# Patient Record
Sex: MALE | Race: OTHER | Employment: FULL TIME | URBAN - METROPOLITAN AREA
[De-identification: names, ages, dates, MRNs, and addresses within clinical notes are randomized per-mention and may not be internally consistent; named-entity substitution may affect disease eponyms.]

---

## 2020-01-18 ENCOUNTER — HOSPITAL ENCOUNTER (EMERGENCY)
Age: 25
Discharge: HOME | End: 2020-01-18
Attending: EMERGENCY MEDICINE
Payer: SELF-PAY

## 2020-01-18 VITALS
SYSTOLIC BLOOD PRESSURE: 135 MMHG | BODY MASS INDEX: 23 KG/M2 | RESPIRATION RATE: 15 BRPM | WEIGHT: 125 LBS | HEIGHT: 62 IN | HEART RATE: 72 BPM | TEMPERATURE: 98.4 F | DIASTOLIC BLOOD PRESSURE: 72 MMHG | OXYGEN SATURATION: 99 %

## 2020-01-18 DIAGNOSIS — K29.90 GASTRITIS AND DUODENITIS: Primary | ICD-10-CM

## 2020-01-18 LAB
ALBUMIN SERPL-MCNC: 4.4 G/DL (ref 3.5–5)
ALBUMIN/GLOB SERPL: 1.5 {RATIO} (ref 1.1–2.2)
ALP SERPL-CCNC: 61 U/L (ref 45–117)
ALT SERPL-CCNC: 62 U/L (ref 12–78)
ANION GAP SERPL CALC-SCNC: 5 MMOL/L (ref 5–15)
APPEARANCE UR: ABNORMAL
AST SERPL-CCNC: 30 U/L (ref 15–37)
BACTERIA URNS QL MICRO: NEGATIVE /HPF
BILIRUB SERPL-MCNC: 0.6 MG/DL (ref 0.2–1)
BILIRUB UR QL: NEGATIVE
BUN SERPL-MCNC: 14 MG/DL (ref 6–20)
BUN/CREAT SERPL: 14 (ref 12–20)
CALCIUM SERPL-MCNC: 9.1 MG/DL (ref 8.5–10.1)
CHLORIDE SERPL-SCNC: 106 MMOL/L (ref 97–108)
CO2 SERPL-SCNC: 30 MMOL/L (ref 21–32)
COLOR UR: ABNORMAL
COMMENT, HOLDF: NORMAL
CREAT SERPL-MCNC: 1.01 MG/DL (ref 0.7–1.3)
EPITH CASTS URNS QL MICRO: ABNORMAL /LPF
ERYTHROCYTE [DISTWIDTH] IN BLOOD BY AUTOMATED COUNT: 11.4 % (ref 11.5–14.5)
GLOBULIN SER CALC-MCNC: 3 G/DL (ref 2–4)
GLUCOSE SERPL-MCNC: 121 MG/DL (ref 65–100)
GLUCOSE UR STRIP.AUTO-MCNC: NEGATIVE MG/DL
HCT VFR BLD AUTO: 46.6 % (ref 36.6–50.3)
HGB BLD-MCNC: 16.3 G/DL (ref 12.1–17)
HGB UR QL STRIP: NEGATIVE
HYALINE CASTS URNS QL MICRO: ABNORMAL /LPF (ref 0–5)
KETONES UR QL STRIP.AUTO: NEGATIVE MG/DL
LEUKOCYTE ESTERASE UR QL STRIP.AUTO: NEGATIVE
LIPASE SERPL-CCNC: 115 U/L (ref 73–393)
MCH RBC QN AUTO: 31.7 PG (ref 26–34)
MCHC RBC AUTO-ENTMCNC: 35 G/DL (ref 30–36.5)
MCV RBC AUTO: 90.5 FL (ref 80–99)
NITRITE UR QL STRIP.AUTO: NEGATIVE
NRBC # BLD: 0 K/UL (ref 0–0.01)
NRBC BLD-RTO: 0 PER 100 WBC
PH UR STRIP: 8.5 [PH] (ref 5–8)
PLATELET # BLD AUTO: 219 K/UL (ref 150–400)
PMV BLD AUTO: 9.8 FL (ref 8.9–12.9)
POTASSIUM SERPL-SCNC: 3.4 MMOL/L (ref 3.5–5.1)
PROT SERPL-MCNC: 7.4 G/DL (ref 6.4–8.2)
PROT UR STRIP-MCNC: NEGATIVE MG/DL
RBC # BLD AUTO: 5.15 M/UL (ref 4.1–5.7)
RBC #/AREA URNS HPF: ABNORMAL /HPF (ref 0–5)
SAMPLES BEING HELD,HOLD: NORMAL
SODIUM SERPL-SCNC: 141 MMOL/L (ref 136–145)
SP GR UR REFRACTOMETRY: 1.01 (ref 1–1.03)
UR CULT HOLD, URHOLD: NORMAL
UROBILINOGEN UR QL STRIP.AUTO: 0.2 EU/DL (ref 0.2–1)
WBC # BLD AUTO: 10.1 K/UL (ref 4.1–11.1)
WBC URNS QL MICRO: ABNORMAL /HPF (ref 0–4)

## 2020-01-18 PROCEDURE — 80053 COMPREHEN METABOLIC PANEL: CPT

## 2020-01-18 PROCEDURE — 99283 EMERGENCY DEPT VISIT LOW MDM: CPT

## 2020-01-18 PROCEDURE — 83690 ASSAY OF LIPASE: CPT

## 2020-01-18 PROCEDURE — 81001 URINALYSIS AUTO W/SCOPE: CPT

## 2020-01-18 PROCEDURE — 85027 COMPLETE CBC AUTOMATED: CPT

## 2020-01-18 PROCEDURE — 36415 COLL VENOUS BLD VENIPUNCTURE: CPT

## 2020-01-18 RX ORDER — FAMOTIDINE 20 MG/1
20 TABLET, FILM COATED ORAL
Status: DISCONTINUED | OUTPATIENT
Start: 2020-01-18 | End: 2020-01-18 | Stop reason: HOSPADM

## 2020-01-18 RX ORDER — FAMOTIDINE 20 MG/1
20 TABLET, FILM COATED ORAL
Qty: 30 TAB | Refills: 0 | Status: SHIPPED | OUTPATIENT
Start: 2020-01-18

## 2020-01-18 RX ORDER — ONDANSETRON 4 MG/1
4 TABLET, ORALLY DISINTEGRATING ORAL
Qty: 10 TAB | Refills: 0 | Status: SHIPPED | OUTPATIENT
Start: 2020-01-18

## 2020-01-18 NOTE — ED TRIAGE NOTES
Having stomach pains starting yesterday evening, worsening today especially with any movement. Vomited last night, but not yet today. States it feels like even his bladder hurts. Able to void, and having frequency.

## 2020-01-18 NOTE — ED PROVIDER NOTES
20-year-old male with no significant past medical history, no significant past surgical history presents to the emergency department stating that yesterday evening after dinner he began noting sharp epigastric abdominal pain associated with one episode of nausea and vomiting overnight. He tried taking some Tums last night to no avail of his symptoms. He states that today he has had waxing and waning symptoms predominantly in the epigastrium but also radiating down lower in his stomach around his bellybutton. He denies any further nausea, vomiting, dysuria, diarrhea, blood in stool. He states that his symptoms did seem to get worse after he ate a burrito for lunch. He denies eating anything particularly very spicy or any other suspicious food. On arrival to the emergency department the patient denies any further symptoms currently, denies any fever, chills, or any other medical concerns. No past medical history on file. No past surgical history on file. No family history on file.     Social History     Socioeconomic History    Marital status: Not on file     Spouse name: Not on file    Number of children: Not on file    Years of education: Not on file    Highest education level: Not on file   Occupational History    Not on file   Social Needs    Financial resource strain: Not on file    Food insecurity:     Worry: Not on file     Inability: Not on file    Transportation needs:     Medical: Not on file     Non-medical: Not on file   Tobacco Use    Smoking status: Not on file   Substance and Sexual Activity    Alcohol use: Not on file    Drug use: Not on file    Sexual activity: Not on file   Lifestyle    Physical activity:     Days per week: Not on file     Minutes per session: Not on file    Stress: Not on file   Relationships    Social connections:     Talks on phone: Not on file     Gets together: Not on file     Attends [employer] service: Not on file     Active member of club or organization: Not on file     Attends meetings of clubs or organizations: Not on file     Relationship status: Not on file    Intimate partner violence:     Fear of current or ex partner: Not on file     Emotionally abused: Not on file     Physically abused: Not on file     Forced sexual activity: Not on file   Other Topics Concern    Not on file   Social History Narrative    Not on file         ALLERGIES: Patient has no known allergies. Review of Systems   Constitutional: Positive for activity change and appetite change. Negative for chills and fever. HENT: Negative for congestion, rhinorrhea, sinus pain, sneezing and sore throat. Eyes: Negative for photophobia and visual disturbance. Respiratory: Negative for cough and shortness of breath. Cardiovascular: Negative for chest pain. Gastrointestinal: Positive for abdominal pain, nausea and vomiting. Negative for blood in stool, constipation and diarrhea. Genitourinary: Negative for difficulty urinating, dysuria, flank pain, hematuria, penile pain and testicular pain. Musculoskeletal: Negative for arthralgias, back pain, myalgias and neck pain. Skin: Negative for rash and wound. Neurological: Negative for syncope, weakness, light-headedness, numbness and headaches. Psychiatric/Behavioral: Negative for self-injury and suicidal ideas. All other systems reviewed and are negative. Vitals:    01/18/20 1748   BP: 137/75   Pulse: 74   Resp: 15   Temp: 98.4 °F (36.9 °C)   SpO2: 99%   Weight: 56.7 kg (125 lb)   Height: 5' 2\" (1.575 m)            Physical Exam  Vitals signs and nursing note reviewed. Constitutional:       General: He is not in acute distress. Appearance: He is well-developed. He is not diaphoretic. Comments: Well-appearing, smiling in no acute distress. HENT:      Head: Normocephalic and atraumatic.       Nose: Nose normal.   Eyes:      Conjunctiva/sclera: Conjunctivae normal.      Pupils: Pupils are equal, round, and reactive to light. Neck:      Musculoskeletal: Neck supple. Cardiovascular:      Rate and Rhythm: Normal rate and regular rhythm. Heart sounds: Normal heart sounds. Pulmonary:      Effort: Pulmonary effort is normal.      Breath sounds: Normal breath sounds. Abdominal:      General: There is no distension. Palpations: Abdomen is soft. Tenderness: There is no tenderness. There is no right CVA tenderness, left CVA tenderness, guarding or rebound. Negative signs include Morgan's sign, Rovsing's sign, McBurney's sign, psoas sign and obturator sign. Musculoskeletal:         General: No tenderness. Skin:     General: Skin is warm and dry. Neurological:      Mental Status: He is alert and oriented to person, place, and time. GCS: GCS eye subscore is 4. GCS verbal subscore is 5. GCS motor subscore is 6. Cranial Nerves: No cranial nerve deficit. Sensory: No sensory deficit. Coordination: Coordination normal.          MDM   78-year-old male with epigastric abdominal pain associated with eating presents to the emergency department afebrile with vital signs stable. Benign abdomen on exam with no evidence of peritonitis. No tenderness over right lower quadrant and negative Morgan sign. UA shows no evidence of infection or hematuria. Labs returned reassuringly showing no significant abnormalities. He was given GI cocktail in the ED and had improvement in his symptoms. Rx'd Pepcid for further symptomatic relief and recommended avoiding spicy and other aggravating foods. Recommended GI follow-up if symptoms persist despite medication and dietary changes. Return precautions were given for worsening or concerns.     Procedures

## 2022-12-30 ENCOUNTER — OFFICE VISIT (OUTPATIENT)
Dept: FAMILY MEDICINE CLINIC | Age: 27
End: 2022-12-30

## 2022-12-30 VITALS
BODY MASS INDEX: 23.11 KG/M2 | HEART RATE: 66 BPM | DIASTOLIC BLOOD PRESSURE: 70 MMHG | OXYGEN SATURATION: 100 % | SYSTOLIC BLOOD PRESSURE: 133 MMHG | TEMPERATURE: 98 F | WEIGHT: 125.6 LBS | HEIGHT: 62 IN

## 2022-12-30 DIAGNOSIS — M79.675 PAIN OF TOE OF LEFT FOOT: Primary | ICD-10-CM

## 2022-12-30 PROCEDURE — 99203 OFFICE O/P NEW LOW 30 MIN: CPT | Performed by: FAMILY MEDICINE

## 2022-12-30 RX ORDER — DICLOFENAC SODIUM 10 MG/G
2 GEL TOPICAL 3 TIMES DAILY
Qty: 100 G | Refills: 2 | Status: SHIPPED | OUTPATIENT
Start: 2022-12-30

## 2022-12-30 NOTE — PROGRESS NOTES
Chief Complaint   Patient presents with    Foot Pain     Left foot pain. Started 10/2022 with minimal pain and has increased since. Pain increases when walking and when wearing closed shoes. Patient feels mass on forefoot region. Pt has taken tylenol and then ibuprofen to help relieve pain.      Visit Vitals  /70 (BP 1 Location: Left upper arm, BP Patient Position: Sitting, BP Cuff Size: Adult)   Pulse 66   Temp 98 °F (36.7 °C) (Temporal)   Ht 5' 2.4\" (1.585 m)   Wt 125 lb 9.6 oz (57 kg)   SpO2 100%   BMI 22.68 kg/m²

## 2022-12-30 NOTE — PROGRESS NOTES
Augusta Kellogg (: 1995) is a 32 y.o. male, new patient, here for evaluation of the following chief complaint(s): Foot Pain (Left foot pain. Started 10/2022 with minimal pain and has increased since. Pain increases when walking and when wearing closed shoes. Patient feels mass on forefoot region. Pt has taken tylenol and then ibuprofen to help relieve pain.)       ASSESSMENT/PLAN:  1. Pain of toe of left foot  Most pain over small bump which suggests synovial cyst vs neuroma. Tx with Voltaren Gel topical for several weeks. SUBJECTIVE:  Lt foot pain:  pain around Lt 1st MTP since 10/2022, slowly worsening. Improves with NSAIDs. No swelling, trauma, numbness. Review of Systems    OBJECTIVE:  Blood pressure 133/70, pulse 66, temperature 98 °F (36.7 °C), temperature source Temporal, height 5' 2.4\" (1.585 m), weight 125 lb 9.6 oz (57 kg), SpO2 100 %. Physical Exam  CONSTITUTIONAL:  Well developed. No apparent distress. VASCULAR:  Normal Dorsalis Pedis pulses. EXTREMITIES:  No edema. Tender along dorsum of 1st MTP with 8 mm soft moveable bump. No erythema. NL ROM. No results found for this visit on 22. An electronic signature was used to authenticate this note.   -- Blanca Sanchez MD

## 2025-01-09 NOTE — ED NOTES
Discharge instructions reviewed by provider and signed by patient and RN. Patient discharged ambulatory.
Pt reports that he had abdominal pain when he arrived to ED but currently does not have any pain. Not tender to palpation, no rebound tenderness, no nausea.
General Sunscreen Counseling: I recommended a broad spectrum sunscreen with a SPF of 30 or higher.  I explained that SPF 30 sunscreens block approximately 97 percent of the sun's harmful rays.  Sunscreens should be applied at least 15 minutes prior to expected sun exposure and then every 2 hours after that as long as sun exposure continues. If swimming or exercising sunscreen should be reapplied every 45 minutes to an hour after getting wet or sweating.  One ounce, or the equivalent of a shot glass full of sunscreen, is adequate to protect the skin not covered by a bathing suit. I also recommended a lip balm with a sunscreen as well. Sun protective clothing can be used in lieu of sunscreen but must be worn the entire time you are exposed to the sun's rays.
Detail Level: Detailed